# Patient Record
Sex: MALE | Race: WHITE | Employment: OTHER | URBAN - METROPOLITAN AREA
[De-identification: names, ages, dates, MRNs, and addresses within clinical notes are randomized per-mention and may not be internally consistent; named-entity substitution may affect disease eponyms.]

---

## 2021-08-13 ENCOUNTER — LAB ENCOUNTER (OUTPATIENT)
Dept: LAB | Facility: HOSPITAL | Age: 86
End: 2021-08-13
Attending: INTERNAL MEDICINE
Payer: MEDICARE

## 2021-08-13 DIAGNOSIS — I48.21 PERMANENT ATRIAL FIBRILLATION (HCC): Primary | ICD-10-CM

## 2021-08-13 DIAGNOSIS — I25.810 CORONARY ATHEROSCLEROSIS OF AUTOLOGOUS VEIN BYPASS GRAFT: ICD-10-CM

## 2021-08-13 LAB
ALBUMIN SERPL-MCNC: 3.8 G/DL (ref 3.4–5)
ALBUMIN/GLOB SERPL: 0.9 {RATIO} (ref 1–2)
ALP LIVER SERPL-CCNC: 83 U/L
ALT SERPL-CCNC: 129 U/L
ANION GAP SERPL CALC-SCNC: 6 MMOL/L (ref 0–18)
AST SERPL-CCNC: 40 U/L (ref 15–37)
BILIRUB SERPL-MCNC: 0.5 MG/DL (ref 0.1–2)
BUN BLD-MCNC: 15 MG/DL (ref 7–18)
BUN/CREAT SERPL: 15 (ref 10–20)
CALCIUM BLD-MCNC: 9.4 MG/DL (ref 8.5–10.1)
CHLORIDE SERPL-SCNC: 106 MMOL/L (ref 98–112)
CO2 SERPL-SCNC: 27 MMOL/L (ref 21–32)
CREAT BLD-MCNC: 1 MG/DL
GLOBULIN PLAS-MCNC: 4.3 G/DL (ref 2.8–4.4)
GLUCOSE BLD-MCNC: 91 MG/DL (ref 70–99)
M PROTEIN MFR SERPL ELPH: 8.1 G/DL (ref 6.4–8.2)
OSMOLALITY SERPL CALC.SUM OF ELEC: 288 MOSM/KG (ref 275–295)
PATIENT FASTING Y/N/NP: NO
POTASSIUM SERPL-SCNC: 4.5 MMOL/L (ref 3.5–5.1)
SODIUM SERPL-SCNC: 139 MMOL/L (ref 136–145)

## 2021-08-13 PROCEDURE — 80053 COMPREHEN METABOLIC PANEL: CPT

## 2021-08-13 PROCEDURE — 36415 COLL VENOUS BLD VENIPUNCTURE: CPT

## 2022-06-01 ENCOUNTER — APPOINTMENT (OUTPATIENT)
Dept: CT IMAGING | Age: 87
End: 2022-06-01
Attending: NEUROLOGICAL SURGERY

## 2022-07-28 ENCOUNTER — LAB ENCOUNTER (OUTPATIENT)
Dept: LAB | Facility: HOSPITAL | Age: 87
End: 2022-07-28
Attending: SURGERY
Payer: MEDICARE

## 2022-07-28 DIAGNOSIS — K80.01 CALCULUS OF GALLBLADDER WITH ACUTE CHOLECYSTITIS AND OBSTRUCTION: Primary | ICD-10-CM

## 2022-07-28 DIAGNOSIS — I48.11 LONGSTANDING PERSISTENT ATRIAL FIBRILLATION (HCC): ICD-10-CM

## 2022-07-28 LAB
ALBUMIN SERPL-MCNC: 3.7 G/DL (ref 3.4–5)
ALBUMIN/GLOB SERPL: 0.8 {RATIO} (ref 1–2)
ALP LIVER SERPL-CCNC: 72 U/L
ALT SERPL-CCNC: 47 U/L
ANION GAP SERPL CALC-SCNC: 6 MMOL/L (ref 0–18)
AST SERPL-CCNC: 25 U/L (ref 15–37)
BASOPHILS # BLD AUTO: 0.05 X10(3) UL (ref 0–0.2)
BASOPHILS NFR BLD AUTO: 0.4 %
BILIRUB SERPL-MCNC: 0.5 MG/DL (ref 0.1–2)
BUN BLD-MCNC: 19 MG/DL (ref 7–18)
BUN/CREAT SERPL: 15.8 (ref 10–20)
CALCIUM BLD-MCNC: 9.8 MG/DL (ref 8.5–10.1)
CHLORIDE SERPL-SCNC: 102 MMOL/L (ref 98–112)
CO2 SERPL-SCNC: 29 MMOL/L (ref 21–32)
CREAT BLD-MCNC: 1.2 MG/DL
DEPRECATED RDW RBC AUTO: 45.9 FL (ref 35.1–46.3)
EOSINOPHIL # BLD AUTO: 0.48 X10(3) UL (ref 0–0.7)
EOSINOPHIL NFR BLD AUTO: 4.3 %
ERYTHROCYTE [DISTWIDTH] IN BLOOD BY AUTOMATED COUNT: 13.2 % (ref 11–15)
FASTING STATUS PATIENT QL REPORTED: YES
GLOBULIN PLAS-MCNC: 4.9 G/DL (ref 2.8–4.4)
GLUCOSE BLD-MCNC: 92 MG/DL (ref 70–99)
HCT VFR BLD AUTO: 42.7 %
HGB BLD-MCNC: 13.8 G/DL
IMM GRANULOCYTES # BLD AUTO: 0.06 X10(3) UL (ref 0–1)
IMM GRANULOCYTES NFR BLD: 0.5 %
LIPASE SERPL-CCNC: 299 U/L (ref 73–393)
LYMPHOCYTES # BLD AUTO: 2.04 X10(3) UL (ref 1–4)
LYMPHOCYTES NFR BLD AUTO: 18.2 %
MCH RBC QN AUTO: 30.5 PG (ref 26–34)
MCHC RBC AUTO-ENTMCNC: 32.3 G/DL (ref 31–37)
MCV RBC AUTO: 94.3 FL
MONOCYTES # BLD AUTO: 0.94 X10(3) UL (ref 0.1–1)
MONOCYTES NFR BLD AUTO: 8.4 %
NEUTROPHILS # BLD AUTO: 7.65 X10 (3) UL (ref 1.5–7.7)
NEUTROPHILS # BLD AUTO: 7.65 X10(3) UL (ref 1.5–7.7)
NEUTROPHILS NFR BLD AUTO: 68.2 %
OSMOLALITY SERPL CALC.SUM OF ELEC: 286 MOSM/KG (ref 275–295)
PLATELET # BLD AUTO: 342 10(3)UL (ref 150–450)
POTASSIUM SERPL-SCNC: 4.1 MMOL/L (ref 3.5–5.1)
PROT SERPL-MCNC: 8.6 G/DL (ref 6.4–8.2)
RBC # BLD AUTO: 4.53 X10(6)UL
SODIUM SERPL-SCNC: 137 MMOL/L (ref 136–145)
WBC # BLD AUTO: 11.2 X10(3) UL (ref 4–11)

## 2022-07-28 PROCEDURE — 85025 COMPLETE CBC W/AUTO DIFF WBC: CPT

## 2022-07-28 PROCEDURE — 36415 COLL VENOUS BLD VENIPUNCTURE: CPT

## 2022-07-28 PROCEDURE — 83690 ASSAY OF LIPASE: CPT

## 2022-07-28 PROCEDURE — 80053 COMPREHEN METABOLIC PANEL: CPT

## 2022-08-02 ENCOUNTER — HOSPITAL ENCOUNTER (OUTPATIENT)
Dept: CT IMAGING | Facility: HOSPITAL | Age: 87
Discharge: HOME | End: 2022-08-02
Attending: INTERNAL MEDICINE
Payer: MEDICARE

## 2022-08-02 DIAGNOSIS — K85.90 PANCREATITIS: ICD-10-CM

## 2022-08-02 DIAGNOSIS — K80.20 GALL STONES: ICD-10-CM

## 2022-08-02 PROCEDURE — 74177 CT ABD & PELVIS W/CONTRAST: CPT | Performed by: INTERNAL MEDICINE

## 2022-08-14 ENCOUNTER — APPOINTMENT (OUTPATIENT)
Dept: GENERAL RADIOLOGY | Facility: HOSPITAL | Age: 87
End: 2022-08-14
Attending: EMERGENCY MEDICINE
Payer: MEDICARE

## 2022-08-14 ENCOUNTER — HOSPITAL ENCOUNTER (EMERGENCY)
Facility: HOSPITAL | Age: 87
Discharge: HOME | End: 2022-08-14
Attending: EMERGENCY MEDICINE
Payer: MEDICARE

## 2022-08-14 VITALS
DIASTOLIC BLOOD PRESSURE: 75 MMHG | OXYGEN SATURATION: 99 % | WEIGHT: 190 LBS | SYSTOLIC BLOOD PRESSURE: 132 MMHG | HEART RATE: 70 BPM | BODY MASS INDEX: 30.53 KG/M2 | HEIGHT: 66 IN | TEMPERATURE: 97 F | RESPIRATION RATE: 18 BRPM

## 2022-08-14 DIAGNOSIS — S22.41XA CLOSED FRACTURE OF MULTIPLE RIBS OF RIGHT SIDE, INITIAL ENCOUNTER: Primary | ICD-10-CM

## 2022-08-14 LAB
BILIRUB UR QL: NEGATIVE
CLARITY UR: CLEAR
COLOR UR: YELLOW
GLUCOSE UR-MCNC: NEGATIVE MG/DL
HGB UR QL STRIP.AUTO: NEGATIVE
KETONES UR-MCNC: NEGATIVE MG/DL
LEUKOCYTE ESTERASE UR QL STRIP.AUTO: NEGATIVE
NITRITE UR QL STRIP.AUTO: NEGATIVE
PH UR: 5.5 [PH] (ref 5–8)
PROT UR-MCNC: NEGATIVE MG/DL
SP GR UR STRIP: 1.01 (ref 1–1.03)
UROBILINOGEN UR STRIP-ACNC: 0.2

## 2022-08-14 PROCEDURE — 99283 EMERGENCY DEPT VISIT LOW MDM: CPT

## 2022-08-14 PROCEDURE — 81003 URINALYSIS AUTO W/O SCOPE: CPT | Performed by: EMERGENCY MEDICINE

## 2022-08-14 PROCEDURE — 71101 X-RAY EXAM UNILAT RIBS/CHEST: CPT | Performed by: EMERGENCY MEDICINE

## 2022-08-14 RX ORDER — FUROSEMIDE 20 MG/1
20 TABLET ORAL 2 TIMES DAILY
COMMUNITY

## 2022-08-14 RX ORDER — LOSARTAN POTASSIUM 25 MG/1
TABLET ORAL DAILY
COMMUNITY

## 2022-08-14 RX ORDER — SPIRONOLACTONE 25 MG/1
25 TABLET ORAL DAILY
COMMUNITY

## 2022-08-14 RX ORDER — ASPIRIN 81 MG/1
81 TABLET ORAL DAILY
COMMUNITY

## 2022-08-14 RX ORDER — FEXOFENADINE HCL 180 MG/1
180 TABLET ORAL DAILY
COMMUNITY

## 2022-08-14 RX ORDER — CARVEDILOL 3.12 MG/1
3.12 TABLET ORAL 2 TIMES DAILY
COMMUNITY
Start: 2022-05-11

## 2022-08-14 RX ORDER — PANTOPRAZOLE SODIUM 40 MG/1
40 TABLET, DELAYED RELEASE ORAL
COMMUNITY

## 2022-08-14 NOTE — ED INITIAL ASSESSMENT (HPI)
Patient fell backward and hit his back on a chair this morning. Patient c/o mid right back pain. Patient on eliquis. Patient took tylenol with relief.  Patient ambulatory in triage

## 2022-08-23 RX ORDER — SODIUM CHLORIDE 9 MG/ML
INJECTION, SOLUTION INTRAVENOUS
OUTPATIENT
Start: 2022-08-24 | End: 2022-08-24

## 2022-08-31 RX ORDER — ACETAMINOPHEN 160 MG
2000 TABLET,DISINTEGRATING ORAL DAILY
COMMUNITY

## 2022-08-31 RX ORDER — VIT A/VIT C/VIT E/ZINC/COPPER 7160-113
TABLET, DELAYED RELEASE (ENTERIC COATED) ORAL DAILY
COMMUNITY

## 2022-09-01 ENCOUNTER — HOSPITAL ENCOUNTER (OUTPATIENT)
Dept: INTERVENTIONAL RADIOLOGY/VASCULAR | Facility: HOSPITAL | Age: 87
Discharge: HOME | End: 2022-09-01
Attending: INTERNAL MEDICINE
Payer: MEDICARE

## 2022-09-06 ENCOUNTER — NURSE ONLY (OUTPATIENT)
Dept: LAB | Facility: HOSPITAL | Age: 87
End: 2022-09-06
Attending: INTERNAL MEDICINE
Payer: MEDICARE

## 2022-09-06 DIAGNOSIS — Z01.818 PRE-OP TESTING: ICD-10-CM

## 2022-09-06 LAB
ANION GAP SERPL CALC-SCNC: 6 MMOL/L (ref 0–18)
BUN BLD-MCNC: 17 MG/DL (ref 7–18)
BUN/CREAT SERPL: 15.2 (ref 10–20)
CALCIUM BLD-MCNC: 9.3 MG/DL (ref 8.5–10.1)
CHLORIDE SERPL-SCNC: 102 MMOL/L (ref 98–112)
CO2 SERPL-SCNC: 29 MMOL/L (ref 21–32)
CREAT BLD-MCNC: 1.12 MG/DL
DEPRECATED RDW RBC AUTO: 53.8 FL (ref 35.1–46.3)
ERYTHROCYTE [DISTWIDTH] IN BLOOD BY AUTOMATED COUNT: 14.8 % (ref 11–15)
FASTING STATUS PATIENT QL REPORTED: NO
GFR SERPLBLD BASED ON 1.73 SQ M-ARVRAT: 64 ML/MIN/1.73M2 (ref 60–?)
GLUCOSE BLD-MCNC: 89 MG/DL (ref 70–99)
HCT VFR BLD AUTO: 38.6 %
HGB BLD-MCNC: 12.1 G/DL
INR BLD: 1.57 (ref 0.85–1.16)
MCH RBC QN AUTO: 30.7 PG (ref 26–34)
MCHC RBC AUTO-ENTMCNC: 31.3 G/DL (ref 31–37)
MCV RBC AUTO: 98 FL
OSMOLALITY SERPL CALC.SUM OF ELEC: 285 MOSM/KG (ref 275–295)
PLATELET # BLD AUTO: 330 10(3)UL (ref 150–450)
POTASSIUM SERPL-SCNC: 4.7 MMOL/L (ref 3.5–5.1)
PROTHROMBIN TIME: 18.6 SECONDS (ref 11.6–14.8)
RBC # BLD AUTO: 3.94 X10(6)UL
SARS-COV-2 RNA RESP QL NAA+PROBE: NOT DETECTED
SODIUM SERPL-SCNC: 137 MMOL/L (ref 136–145)
WBC # BLD AUTO: 8.5 X10(3) UL (ref 4–11)

## 2022-09-06 PROCEDURE — 85610 PROTHROMBIN TIME: CPT

## 2022-09-06 PROCEDURE — 80048 BASIC METABOLIC PNL TOTAL CA: CPT

## 2022-09-06 PROCEDURE — 85027 COMPLETE CBC AUTOMATED: CPT

## 2022-09-06 PROCEDURE — 36415 COLL VENOUS BLD VENIPUNCTURE: CPT

## 2022-09-09 ENCOUNTER — HOSPITAL ENCOUNTER (OUTPATIENT)
Dept: INTERVENTIONAL RADIOLOGY/VASCULAR | Facility: HOSPITAL | Age: 87
Discharge: HOME | End: 2022-09-09
Attending: INTERNAL MEDICINE | Admitting: INTERNAL MEDICINE
Payer: MEDICARE

## 2022-09-09 VITALS
BODY MASS INDEX: 30.86 KG/M2 | DIASTOLIC BLOOD PRESSURE: 60 MMHG | SYSTOLIC BLOOD PRESSURE: 120 MMHG | RESPIRATION RATE: 13 BRPM | OXYGEN SATURATION: 96 % | HEIGHT: 66 IN | HEART RATE: 70 BPM | TEMPERATURE: 97 F | WEIGHT: 192 LBS

## 2022-09-09 DIAGNOSIS — I47.2 VT (VENTRICULAR TACHYCARDIA) (HCC): ICD-10-CM

## 2022-09-09 DIAGNOSIS — R94.39 ABNORMAL STRESS TEST: ICD-10-CM

## 2022-09-09 DIAGNOSIS — I50.9 CONGESTIVE HEART FAILURE (CHF) (HCC): ICD-10-CM

## 2022-09-09 DIAGNOSIS — Z95.1 HISTORY OF CORONARY ARTERY BYPASS GRAFT: ICD-10-CM

## 2022-09-09 DIAGNOSIS — Z95.5 HISTORY OF HEART ARTERY STENT: ICD-10-CM

## 2022-09-09 DIAGNOSIS — Z01.818 PRE-OP TESTING: Primary | ICD-10-CM

## 2022-09-09 DIAGNOSIS — I25.10 CAD (CORONARY ARTERY DISEASE): ICD-10-CM

## 2022-09-09 PROCEDURE — B2181ZZ FLUOROSCOPY OF LEFT INTERNAL MAMMARY BYPASS GRAFT USING LOW OSMOLAR CONTRAST: ICD-10-PCS | Performed by: INTERNAL MEDICINE

## 2022-09-09 PROCEDURE — B2131ZZ FLUOROSCOPY OF MULTIPLE CORONARY ARTERY BYPASS GRAFTS USING LOW OSMOLAR CONTRAST: ICD-10-PCS | Performed by: INTERNAL MEDICINE

## 2022-09-09 PROCEDURE — 4A023N7 MEASUREMENT OF CARDIAC SAMPLING AND PRESSURE, LEFT HEART, PERCUTANEOUS APPROACH: ICD-10-PCS | Performed by: INTERNAL MEDICINE

## 2022-09-09 PROCEDURE — 99152 MOD SED SAME PHYS/QHP 5/>YRS: CPT

## 2022-09-09 PROCEDURE — B2111ZZ FLUOROSCOPY OF MULTIPLE CORONARY ARTERIES USING LOW OSMOLAR CONTRAST: ICD-10-PCS | Performed by: INTERNAL MEDICINE

## 2022-09-09 PROCEDURE — B2151ZZ FLUOROSCOPY OF LEFT HEART USING LOW OSMOLAR CONTRAST: ICD-10-PCS | Performed by: INTERNAL MEDICINE

## 2022-09-09 PROCEDURE — 93458 L HRT ARTERY/VENTRICLE ANGIO: CPT

## 2022-09-09 RX ORDER — SODIUM CHLORIDE 9 MG/ML
INJECTION, SOLUTION INTRAVENOUS
Status: DISCONTINUED | OUTPATIENT
Start: 2022-09-09 | End: 2022-09-09

## 2022-09-09 RX ORDER — MIDAZOLAM HYDROCHLORIDE 1 MG/ML
INJECTION INTRAMUSCULAR; INTRAVENOUS
Status: COMPLETED
Start: 2022-09-09 | End: 2022-09-09

## 2022-09-09 RX ORDER — LIDOCAINE HYDROCHLORIDE 20 MG/ML
INJECTION, SOLUTION EPIDURAL; INFILTRATION; INTRACAUDAL; PERINEURAL
Status: COMPLETED
Start: 2022-09-09 | End: 2022-09-09

## 2022-09-09 RX ORDER — HEPARIN SODIUM 1000 [USP'U]/ML
INJECTION, SOLUTION INTRAVENOUS; SUBCUTANEOUS
Status: DISCONTINUED
Start: 2022-09-09 | End: 2022-09-09 | Stop reason: WASHOUT

## 2022-09-09 NOTE — INTERVAL H&P NOTE
Pre-op Diagnosis: * No pre-op diagnosis entered *    The above referenced H&P was reviewed by Coretta Jane MD on 9/9/2022, the patient was examined and no significant changes have occurred in the patient's condition since the H&P was performed. I discussed with the patient and/or legal representative the potential benefits, risks and side effects of this procedure; the likelihood of the patient achieving goals; and potential problems that might occur during recuperation. I discussed reasonable alternatives to the procedure, including risks, benefits and side effects related to the alternatives and risks related to not receiving this procedure. We will proceed with procedure as planned.

## 2022-09-09 NOTE — IVS NOTE
Tolerated procedure well. Right groin site clean and dry. No bleeding or swelling noted. Tolerated PO juice and crackers when able to sit up. Dr. Edis Jaime at bedside to talk to ptand family. Up to bathroom to void. Ambulated in the hallway. Right groin remains clean and dry, no bleeding noted. Discharge and follow-up instructions given. To re-start Eliquis on 9/10. Discharged home per W/C,VSS, groin stable.

## 2022-09-09 NOTE — PROCEDURES
[facility]    Cardiac Cath Procedure Note    Michelle Diaz Patient Status:  Outpatient in a Bed    1935 MRN [id number]   Location [facility] Attending Mima Hodgkin, [address] [facility] Day # 0 PCP No primary care provider on file. Cardiologist: Jones Galloway MD  Primary Proceduralist: Jones Galloway MD  Procedure Performed: LHC, LV, VEIN GRAFTS and LIMA  Date of Procedure: 2022   Indication: positive    Summary of procedure:    Patent vein grafts to the LAD, ramus, OM1, OM 2, PDA. Patent LIMA to diagonal.    No critical obstructive coronary disease and therefore may proceed to gallbladder surgery without further cardiac work-up. Left Ventriculography and hemodynamics:   LV EF not done  LV EDP 10 mmHg, 500 cc fluid bolus given  No significant gradient across bioprosthetic aortic valve      Coronary Angiography  RCA:  Dominant 100% occluded at the mid segment  -Patent SVG skip graft limb to the PDA, diffusely diseased native vessel runoff. Left main:  Free of obstructive disease    Left anterior descendin% occluded at the mid segment at site of old stent. Proximal LAD supplies large first septal branch to the base of the heart.    -Patent SVG to mid LAD   -Patent SVG to ramus, skip graft to OM1, OM 2 and PDA as below. Ramus: 100% occluded at the ostium, only visualized via skip graft.  -Patent SVG to ramus to OM1 to OM2 to PDA. No significant native vessel disease. Circumflex:  Free of obstructive disease in the proximal segment. Supplies multiple OM branches which are 100% occluded at the ostium and only visualized via the vein graft.  -Patent SVG to ramus to OM1 to OM2 to PDA, no significant native vessel disease.       Assessment:  False-positive stress test    Preoperative risk stratification: At acceptable risk for gallbladder surgery, may proceed without further intervention    CAD status post CABG: Patent LIMA to diagonal.  Patent SVG to ramus to OM1 to OM2 to PDA. Patent SVG to LAD. 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 Compensated based on LV filling pressures. Recommendations:  No changes to meds    Discharge home in 2 hours      Description of Procedure:   After written informed consent was obtained from the patient, patient was brought to the cardiac catheterization laboratory. Patient was prepped and draped in the usual sterile fashion. Lidocaine 1% was used to infiltrate the right groin for local anesthesia and a 6 Spanish introducer sheath was inserted into the right femoral artery via ultrasound guidance and micropuncture kit. Selective coronary angiography performed with JR4 catheter for RCA and JL4 catheter for LCA. Angiography performed in standard projections. IM catheter for LIMA, JR4 for vein grafts. 6 Georgian JR4 catheter placed in LV for hemodynamics. Selective right femoral angiogram done assess anatomy for closure. Specimen sent to: No specimen collected  Estimated blood loss: 10 cc  Closure:  Perclose       IV was maintained by RN and moderate conscious sedation of versed and fentanyl was given. Patient was assessed and monitoring of oxygen, heart rate and blood pressure by nurse and myself during the exam 35 minutes.       Ema Knott MD  09/09/22

## 2022-09-29 NOTE — PAT NURSING NOTE
Patient with [employer] ICD. Rep will need to be at hospital prior to surgery. Emery Cunningham paged. [phone]: Emery Cunningham returned call and aware of surgery date. Instructed will call back on 10/6 to give time of surgery.

## 2022-10-04 ENCOUNTER — LAB ENCOUNTER (OUTPATIENT)
Dept: LAB | Facility: HOSPITAL | Age: 87
End: 2022-10-04
Attending: SURGERY
Payer: MEDICARE

## 2022-10-04 DIAGNOSIS — Z01.818 PREOP TESTING: ICD-10-CM

## 2022-10-05 LAB — SARS-COV-2 RNA RESP QL NAA+PROBE: NOT DETECTED

## 2022-10-06 NOTE — PAT NURSING NOTE
Cony Noe at [address] [place] [address] notified of patients date and time of surgery

## 2022-10-07 ENCOUNTER — ANESTHESIA EVENT (OUTPATIENT)
Dept: SURGERY | Facility: HOSPITAL | Age: 87
End: 2022-10-07
Payer: MEDICARE

## 2022-10-07 ENCOUNTER — APPOINTMENT (OUTPATIENT)
Dept: GENERAL RADIOLOGY | Facility: HOSPITAL | Age: 87
End: 2022-10-07
Attending: SURGERY
Payer: MEDICARE

## 2022-10-07 ENCOUNTER — ANESTHESIA (OUTPATIENT)
Dept: SURGERY | Facility: HOSPITAL | Age: 87
End: 2022-10-07
Payer: MEDICARE

## 2022-10-07 ENCOUNTER — HOSPITAL ENCOUNTER (OUTPATIENT)
Facility: HOSPITAL | Age: 87
Setting detail: HOSPITAL OUTPATIENT SURGERY
Discharge: HOME | End: 2022-10-07
Attending: SURGERY | Admitting: SURGERY
Payer: MEDICARE

## 2022-10-07 VITALS
HEART RATE: 71 BPM | RESPIRATION RATE: 14 BRPM | DIASTOLIC BLOOD PRESSURE: 83 MMHG | OXYGEN SATURATION: 100 % | SYSTOLIC BLOOD PRESSURE: 151 MMHG | TEMPERATURE: 97 F | HEIGHT: 66 IN | WEIGHT: 182 LBS | BODY MASS INDEX: 29.25 KG/M2

## 2022-10-07 DIAGNOSIS — Z01.818 PREOP TESTING: Primary | ICD-10-CM

## 2022-10-07 PROBLEM — K80.50 COMMON BILE DUCT STONE: Status: ACTIVE | Noted: 2022-10-07

## 2022-10-07 PROBLEM — K85.10 PANCREATITIS, GALLSTONE: Status: ACTIVE | Noted: 2022-10-07

## 2022-10-07 PROBLEM — K85.10 PANCREATITIS, GALLSTONE (HCC): Status: ACTIVE | Noted: 2022-10-07

## 2022-10-07 LAB — GLUCOSE BLDC GLUCOMTR-MCNC: 203 MG/DL (ref 70–99)

## 2022-10-07 PROCEDURE — 0FT44ZZ RESECTION OF GALLBLADDER, PERCUTANEOUS ENDOSCOPIC APPROACH: ICD-10-PCS | Performed by: SURGERY

## 2022-10-07 PROCEDURE — 88304 TISSUE EXAM BY PATHOLOGIST: CPT | Performed by: SURGERY

## 2022-10-07 PROCEDURE — 82962 GLUCOSE BLOOD TEST: CPT

## 2022-10-07 PROCEDURE — BF502Z0 OTHER IMAGING OF BILE DUCTS USING FLUORESCING AGENT, INTRAOPERATIVE: ICD-10-PCS | Performed by: SURGERY

## 2022-10-07 PROCEDURE — 0FC94ZZ EXTIRPATION OF MATTER FROM COMMON BILE DUCT, PERCUTANEOUS ENDOSCOPIC APPROACH: ICD-10-PCS | Performed by: SURGERY

## 2022-10-07 PROCEDURE — 74300 X-RAY BILE DUCTS/PANCREAS: CPT | Performed by: SURGERY

## 2022-10-07 RX ORDER — NALOXONE HYDROCHLORIDE 0.4 MG/ML
80 INJECTION, SOLUTION INTRAMUSCULAR; INTRAVENOUS; SUBCUTANEOUS AS NEEDED
Status: DISCONTINUED | OUTPATIENT
Start: 2022-10-07 | End: 2022-10-07

## 2022-10-07 RX ORDER — SODIUM CHLORIDE 9 MG/ML
INJECTION, SOLUTION INTRAVENOUS CONTINUOUS
Status: DISCONTINUED | OUTPATIENT
Start: 2022-10-07 | End: 2022-10-07

## 2022-10-07 RX ORDER — MORPHINE SULFATE 4 MG/ML
4 INJECTION, SOLUTION INTRAMUSCULAR; INTRAVENOUS EVERY 10 MIN PRN
Status: DISCONTINUED | OUTPATIENT
Start: 2022-10-07 | End: 2022-10-07

## 2022-10-07 RX ORDER — ROCURONIUM BROMIDE 10 MG/ML
INJECTION, SOLUTION INTRAVENOUS AS NEEDED
Status: DISCONTINUED | OUTPATIENT
Start: 2022-10-07 | End: 2022-10-07 | Stop reason: SURG

## 2022-10-07 RX ORDER — LIDOCAINE HYDROCHLORIDE 10 MG/ML
INJECTION, SOLUTION EPIDURAL; INFILTRATION; INTRACAUDAL; PERINEURAL AS NEEDED
Status: DISCONTINUED | OUTPATIENT
Start: 2022-10-07 | End: 2022-10-07 | Stop reason: SURG

## 2022-10-07 RX ORDER — MORPHINE SULFATE 4 MG/ML
2 INJECTION, SOLUTION INTRAMUSCULAR; INTRAVENOUS EVERY 10 MIN PRN
Status: DISCONTINUED | OUTPATIENT
Start: 2022-10-07 | End: 2022-10-07

## 2022-10-07 RX ORDER — HYDROCODONE BITARTRATE AND ACETAMINOPHEN 5; 325 MG/1; MG/1
1 TABLET ORAL EVERY 8 HOURS PRN
Qty: 10 TABLET | Refills: 0 | Status: SHIPPED | OUTPATIENT
Start: 2022-10-07 | End: 2022-10-17

## 2022-10-07 RX ORDER — EPHEDRINE SULFATE 50 MG/ML
INJECTION, SOLUTION INTRAVENOUS AS NEEDED
Status: DISCONTINUED | OUTPATIENT
Start: 2022-10-07 | End: 2022-10-07 | Stop reason: SURG

## 2022-10-07 RX ORDER — ACETAMINOPHEN 500 MG
1000 TABLET ORAL ONCE
Status: COMPLETED | OUTPATIENT
Start: 2022-10-07 | End: 2022-10-07

## 2022-10-07 RX ORDER — SODIUM CHLORIDE, SODIUM LACTATE, POTASSIUM CHLORIDE, CALCIUM CHLORIDE 600; 310; 30; 20 MG/100ML; MG/100ML; MG/100ML; MG/100ML
INJECTION, SOLUTION INTRAVENOUS CONTINUOUS
Status: DISCONTINUED | OUTPATIENT
Start: 2022-10-07 | End: 2022-10-07

## 2022-10-07 RX ORDER — MORPHINE SULFATE 10 MG/ML
6 INJECTION, SOLUTION INTRAMUSCULAR; INTRAVENOUS EVERY 10 MIN PRN
Status: DISCONTINUED | OUTPATIENT
Start: 2022-10-07 | End: 2022-10-07

## 2022-10-07 RX ORDER — HYDROMORPHONE HYDROCHLORIDE 1 MG/ML
0.4 INJECTION, SOLUTION INTRAMUSCULAR; INTRAVENOUS; SUBCUTANEOUS EVERY 5 MIN PRN
Status: DISCONTINUED | OUTPATIENT
Start: 2022-10-07 | End: 2022-10-07

## 2022-10-07 RX ORDER — CEFAZOLIN SODIUM/WATER 2 G/20 ML
2 SYRINGE (ML) INTRAVENOUS ONCE
Status: COMPLETED | OUTPATIENT
Start: 2022-10-07 | End: 2022-10-07

## 2022-10-07 RX ORDER — BUPIVACAINE HYDROCHLORIDE 2.5 MG/ML
INJECTION, SOLUTION EPIDURAL; INFILTRATION; INTRACAUDAL AS NEEDED
Status: DISCONTINUED | OUTPATIENT
Start: 2022-10-07 | End: 2022-10-07 | Stop reason: HOSPADM

## 2022-10-07 RX ORDER — PHENYLEPHRINE HCL 10 MG/ML
VIAL (ML) INJECTION AS NEEDED
Status: DISCONTINUED | OUTPATIENT
Start: 2022-10-07 | End: 2022-10-07 | Stop reason: SURG

## 2022-10-07 RX ORDER — HYDROMORPHONE HYDROCHLORIDE 1 MG/ML
0.6 INJECTION, SOLUTION INTRAMUSCULAR; INTRAVENOUS; SUBCUTANEOUS EVERY 5 MIN PRN
Status: DISCONTINUED | OUTPATIENT
Start: 2022-10-07 | End: 2022-10-07

## 2022-10-07 RX ADMIN — PHENYLEPHRINE HCL 100 MCG: 10 MG/ML VIAL (ML) INJECTION at 09:24:00

## 2022-10-07 RX ADMIN — PHENYLEPHRINE HCL 100 MCG: 10 MG/ML VIAL (ML) INJECTION at 09:13:00

## 2022-10-07 RX ADMIN — SODIUM CHLORIDE: 9 INJECTION, SOLUTION INTRAVENOUS at 10:36:00

## 2022-10-07 RX ADMIN — ROCURONIUM BROMIDE 30 MG: 10 INJECTION, SOLUTION INTRAVENOUS at 09:08:00

## 2022-10-07 RX ADMIN — EPHEDRINE SULFATE 5 MG: 50 INJECTION, SOLUTION INTRAVENOUS at 09:26:00

## 2022-10-07 RX ADMIN — CEFAZOLIN SODIUM/WATER 2 G: 2 G/20 ML SYRINGE (ML) INTRAVENOUS at 09:23:00

## 2022-10-07 RX ADMIN — LIDOCAINE HYDROCHLORIDE 40 MG: 10 INJECTION, SOLUTION EPIDURAL; INFILTRATION; INTRACAUDAL; PERINEURAL at 09:08:00

## 2022-10-07 RX ADMIN — SODIUM CHLORIDE: 9 INJECTION, SOLUTION INTRAVENOUS at 09:07:00

## 2022-10-07 NOTE — ANESTHESIA PROCEDURE NOTES
Airway  Date/Time: 10/7/2022 9:11 AM  Urgency: Elective    Airway not difficult    General Information and Staff    Patient location during procedure: OR  Anesthesiologist: Otoniel Escoto MD  Performed: anesthesiologist     Indications and Patient Condition  Indications for airway management: anesthesia  Sedation level: deep  Preoxygenated: yes  Patient position: sniffing  Mask difficulty assessment: 1 - vent by mask    Final Airway Details  Final airway type: endotracheal airway      Successful airway: ETT  Cuffed: yes   Successful intubation technique: direct laryngoscopy  Endotracheal tube insertion site: oral  Blade: GlideScope  Blade size: #3  ETT size (mm): 7.5    Cormack-Lehane Classification: grade I - full view of glottis  Placement verified by: chest auscultation and capnometry   Measured from: teeth  ETT to teeth (cm): 22  Number of attempts at approach: 1    Additional Comments  OGT placed, gastric contents suctioned

## 2022-10-07 NOTE — OPERATIVE REPORT
[facility]    PATIENT'S NAME: Ann APARICIO   ATTENDING PHYSICIAN: Tico Schmidt MD   OPERATING PHYSICIAN: Tico Schmidt MD   PATIENT ACCOUNT#:   [id number]    LOCATION:  [facility] [address] [facility]  MEDICAL RECORD #:   [id number]       YOB: 1935  ADMISSION DATE:       10/07/2022      OPERATION DATE:  10/07/2022    OPERATIVE REPORT      PREOPERATIVE DIAGNOSIS:  Gallstone pancreatitis. POSTOPERATIVE DIAGNOSIS:  Gallstone pancreatitis. PROCEDURE:  Laparoscopic cholecystectomy, intraoperative cholangiogram, and common bile duct exploration laparoscopically with extraction of common bile duct stone. ASSISTANT:  Gerrie Eisenmenger, CSA. Criss Avalos is a certified surgical assistant needed for the entire procedure for both opening and closing, running the camera, intraoperative assistance, assisting with common duct exploration. INDICATIONS:  The patient is a very pleasant 28-year-old male with gallstone pancreatitis with gallstones. Plan was to take out his gallbladder. He had been worked up by Cardiology. He had multiple cardiac problems including pacemaker on anticoagulants. He was evaluated by Cardiology and cleared for surgery after angiogram, was thought to be a reasonable risk. OPERATIVE TECHNIQUE:  Patient was prepped and draped in usual sterile fashion. Incision was made through the base of the umbilicus. Open technique was used; all trocars placed. Area was inspected with no bleeding or bowel injury. The 5 mm port was placed in the left upper quadrant under direct vision, and 2 ports on the right side under direct vision. The gallbladder wound was minimally inflamed. It was fairly thin. Placed on gentle traction. Cystic duct and cystic artery slowly dissected out. The critical view was seen, and the triangle of Calot was well dissected out. The cystic artery was doubly hemoclipped towards the patient and distally and divided.   We came around the cystic duct which was a little bit larger. It had multiple small stones within the gallbladder. The cystic duct cholangiogram was performed. There was a stone stuck at the ampulla, and we gave glucagon. It still did not pass. We had been getting the wire ready. We passed the catheter itself through the ampulla into the common bile duct, confirmed by x-ray. We pulled it back, and an x-ray showed it passed. The small stone had been pushed through. Cystic duct was triply hemoclipped, careful not to impinge upon the common bile duct. The gallbladder was removed off the gallbladder bed with blunt and sharp dissection, cautery for hemostasis. Gallbladder placed in a bag and removed. We went back in. There were a couple small stones. These were all removed, carefully inspected, and irrigated to be sure there were no others. Once we had cleared it and made sure there was no bile leak or bleeding or stones remaining, we removed the 5 mm ports under direct vision. Fascia closed at the umbilical site with 0 Vicryl and 0 PDS. Wound irrigated. Skin closed with 4-0 Vicryl and Dermabond. Patient tolerated procedure well, went to recovery room in good condition. Dictated By Denisha Casiano MD  d: 10/07/2022 10:51:40  t: 10/07/2022 17:06:16  Job [id number]  [facility]/

## 2022-10-07 NOTE — BRIEF OP NOTE
Pre-Operative Diagnosis: Gallsotne pancreatitis     Post-Operative Diagnosis: Gallsotne pancreatitis      Procedure Performed:   Laparoscopic cholecystectomy, cholangiogram, common bile duct exploratioin .     Surgeon(s) and Role:     * Amarilis Stone MD - Primary    Assistant(s):  Surgical Assistant.: Francie Haider CSA     Surgical Findings:  Common duct stone in ampula on cholangiogram     Specimen: gallbladder to path     Estimated Blood Loss: 10 ml    Dictation Number:  [id number]    Alice Castorena MD  10/7/2022  10:48 AM

## 2022-10-07 NOTE — DISCHARGE SUMMARY
[facility]  Discharge Summary    Ivanna Newell Patient Status:  Hospital Outpatient Surgery    1935 MRN [id number]   Location [address] UNIT Attending Jeana Moran MD   [address] Day # 0 PCP Monsoor Harriett Dandy, MD     Date of Admission: 10/7/2022    Date of Discharge: 10/7/2022      Admitting Diagnosis: Gallsotne pancreatitis    Discharge Diagnosis: Patient Active Problem List:     Pancreatitis, gallstone     Common bile duct stone      Reason for Admission    Removal of gallbladder      Hospital Course: Tolerated surgery well    Procedures: laparoscopic cholecystectomy, intraoperative cholangiogram, common duct exploration    Complications: none    Disposition: Home or Self Care    Discharge Condition: Good    Discharge Medications: Current Discharge Medication List    START taking these medications    HYDROcodone-acetaminophen 5-325 MG Oral Tab  Take 1 tablet by mouth every 8 (eight) hours as needed for Pain (may start with 1/2 tablet). Qty: 10 tablet Refills: 0      CONTINUE these medications which have NOT CHANGED    Multiple Vitamin (THERAGRAN OR)  Take by mouth daily. Multiple Vitamins-Minerals (ICAPS AREDS FORMULA) Oral Tab  Take by mouth daily. cholecalciferol 50 MCG (2000 UT) Oral Cap  Take 2,000 Units by mouth daily. carvedilol 3.125 MG Oral Tab  Take 3.125 mg by mouth 2 (two) times daily. pantoprazole 40 MG Oral Tab EC  Take 40 mg by mouth every morning before breakfast.    furosemide 20 MG Oral Tab  Take 20 mg by mouth daily. losartan 25 MG Oral Tab  Take by mouth daily. spironolactone 25 MG Oral Tab  Take 12.5 mg by mouth daily. apixaban 5 MG Oral Tab  Take 5 mg by mouth 2 (two) times daily. Start morning of 10/8    fexofenadine 180 MG Oral Tab  Take 180 mg by mouth daily. aspirin 81 MG Oral Tab EC  Take 81 mg by mouth daily. Follow up Visits:  Follow-up with Dr Mayela Roberson 1-2 weeks      Yvan Olmedo MD  10/7/2022  10:57 AM

## 2022-10-07 NOTE — ANESTHESIA POSTPROCEDURE EVALUATION
Patient: Onur Wagner    Procedure Summary     Date: 10/07/22 Room / Location: [address] [place] [address] MAIN OR 08 / [address] [place] [address] MAIN OR    Anesthesia Start: 1113 Anesthesia Stop: 6250    Procedure: Laparoscopic cholecystectomy, cholangiogram, common bile duct exploratioin . (N/A Abdomen) Diagnosis: (Gallsotne pancreatitis)    Surgeons: Enrique Sharp MD Anesthesiologist: Isma Levy MD    Anesthesia Type: general ASA Status: 4          Anesthesia Type: general    Vitals Value Taken Time   /79 10/07/22 1035   Temp 97.1 10/07/22 1036   Pulse 70 10/07/22 1036   Resp 19 10/07/22 1036   SpO2 93 % 10/07/22 1036   Vitals shown include unvalidated device data.     [address] [place] [address] AN Post Evaluation:   Patient Evaluated in PACU  Patient Participation: complete - patient participated  Level of Consciousness: awake  Pain Score: 0  Pain Management: adequate  Airway Patency:patent  Dental exam unchanged from preop  Yes    Cardiovascular Status: acceptable  Respiratory Status: acceptable  Postoperative Hydration acceptable      Casey Regan MD  10/7/2022 10:36 AM

## 2024-01-04 ENCOUNTER — OFFICE VISIT (OUTPATIENT)
Dept: AUDIOLOGY | Facility: CLINIC | Age: 89
End: 2024-01-04
Payer: MEDICARE

## 2024-01-04 DIAGNOSIS — H90.3 SENSORINEURAL HEARING LOSS (SNHL) OF BOTH EARS: Primary | ICD-10-CM

## 2024-01-04 PROCEDURE — 92557 COMPREHENSIVE HEARING TEST: CPT | Performed by: AUDIOLOGIST

## 2024-04-29 ENCOUNTER — LAB ENCOUNTER (OUTPATIENT)
Dept: LAB | Facility: HOSPITAL | Age: 89
End: 2024-04-29
Attending: INTERNAL MEDICINE
Payer: MEDICARE

## 2024-04-29 DIAGNOSIS — I50.22 CHRONIC SYSTOLIC HEART FAILURE (HCC): Primary | ICD-10-CM

## 2024-04-29 DIAGNOSIS — I25.810: ICD-10-CM

## 2024-04-29 DIAGNOSIS — I48.21 PERMANENT ATRIAL FIBRILLATION (HCC): ICD-10-CM

## 2024-04-29 LAB
ALBUMIN SERPL-MCNC: 4.4 G/DL (ref 3.2–4.8)
ALBUMIN/GLOB SERPL: 1.2 {RATIO} (ref 1–2)
ALP LIVER SERPL-CCNC: 64 U/L
ALT SERPL-CCNC: 21 U/L
ANION GAP SERPL CALC-SCNC: 6 MMOL/L (ref 0–18)
AST SERPL-CCNC: 22 U/L (ref ?–34)
BILIRUB SERPL-MCNC: 0.8 MG/DL (ref 0.2–1.1)
BUN BLD-MCNC: 15 MG/DL (ref 9–23)
BUN/CREAT SERPL: 16.3 (ref 10–20)
CALCIUM BLD-MCNC: 9.6 MG/DL (ref 8.7–10.4)
CHLORIDE SERPL-SCNC: 108 MMOL/L (ref 98–112)
CHOLEST SERPL-MCNC: 141 MG/DL (ref ?–200)
CO2 SERPL-SCNC: 27 MMOL/L (ref 21–32)
CREAT BLD-MCNC: 0.92 MG/DL
DEPRECATED RDW RBC AUTO: 44.6 FL (ref 35.1–46.3)
EGFRCR SERPLBLD CKD-EPI 2021: 81 ML/MIN/1.73M2 (ref 60–?)
ERYTHROCYTE [DISTWIDTH] IN BLOOD BY AUTOMATED COUNT: 13.2 % (ref 11–15)
FASTING PATIENT LIPID ANSWER: YES
FASTING STATUS PATIENT QL REPORTED: YES
GLOBULIN PLAS-MCNC: 3.6 G/DL (ref 2.8–4.4)
GLUCOSE BLD-MCNC: 121 MG/DL (ref 70–99)
HCT VFR BLD AUTO: 41.4 %
HDLC SERPL-MCNC: 44 MG/DL (ref 40–59)
HGB BLD-MCNC: 13.7 G/DL
LDLC SERPL CALC-MCNC: 81 MG/DL (ref ?–100)
MCH RBC QN AUTO: 30.3 PG (ref 26–34)
MCHC RBC AUTO-ENTMCNC: 33.1 G/DL (ref 31–37)
MCV RBC AUTO: 91.6 FL
NONHDLC SERPL-MCNC: 97 MG/DL (ref ?–130)
OSMOLALITY SERPL CALC.SUM OF ELEC: 294 MOSM/KG (ref 275–295)
PLATELET # BLD AUTO: 279 10(3)UL (ref 150–450)
POTASSIUM SERPL-SCNC: 4.4 MMOL/L (ref 3.5–5.1)
PROT SERPL-MCNC: 8 G/DL (ref 5.7–8.2)
RBC # BLD AUTO: 4.52 X10(6)UL
SODIUM SERPL-SCNC: 141 MMOL/L (ref 136–145)
TRIGL SERPL-MCNC: 81 MG/DL (ref 30–149)
VLDLC SERPL CALC-MCNC: 13 MG/DL (ref 0–30)
WBC # BLD AUTO: 9.4 X10(3) UL (ref 4–11)

## 2024-04-29 PROCEDURE — 80053 COMPREHEN METABOLIC PANEL: CPT

## 2024-04-29 PROCEDURE — 80061 LIPID PANEL: CPT

## 2024-04-29 PROCEDURE — 36415 COLL VENOUS BLD VENIPUNCTURE: CPT

## 2024-04-29 PROCEDURE — 85027 COMPLETE CBC AUTOMATED: CPT

## 2025-04-29 ENCOUNTER — HOSPITAL ENCOUNTER (OUTPATIENT)
Dept: CT IMAGING | Facility: HOSPITAL | Age: 89
Discharge: HOME | End: 2025-04-29
Attending: INTERNAL MEDICINE
Payer: MEDICARE

## 2025-04-29 DIAGNOSIS — M48.02 CERVICAL SPINAL STENOSIS: ICD-10-CM

## 2025-04-29 PROCEDURE — 72125 CT NECK SPINE W/O DYE: CPT | Performed by: INTERNAL MEDICINE

## 2025-08-01 ENCOUNTER — HOSPITAL ENCOUNTER (OUTPATIENT)
Dept: CT IMAGING | Facility: HOSPITAL | Age: 89
Discharge: HOME | End: 2025-08-01
Attending: INTERNAL MEDICINE

## 2025-08-01 DIAGNOSIS — N20.0 KIDNEY STONE: ICD-10-CM

## 2025-08-01 PROCEDURE — 74176 CT ABD & PELVIS W/O CONTRAST: CPT | Performed by: INTERNAL MEDICINE

## 2025-08-13 ENCOUNTER — HOSPITAL ENCOUNTER (OUTPATIENT)
Dept: CT IMAGING | Facility: HOSPITAL | Age: 89
Discharge: HOME | End: 2025-08-13
Attending: INTERNAL MEDICINE

## 2025-08-13 DIAGNOSIS — R91.8 OPACITY OF LUNG ON IMAGING STUDY: ICD-10-CM

## 2025-08-13 LAB
CREAT BLD-MCNC: 1.3 MG/DL (ref 0.7–1.3)
EGFRCR SERPLBLD CKD-EPI 2021: 53 ML/MIN/1.73M2 (ref 60–?)

## 2025-08-13 PROCEDURE — 82565 ASSAY OF CREATININE: CPT

## 2025-08-13 PROCEDURE — 71260 CT THORAX DX C+: CPT | Performed by: INTERNAL MEDICINE

## (undated) DEVICE — TROCAR: Brand: KII FIOS FIRST ENTRY

## (undated) DEVICE — DISPOSABLE LAPAROSCOPIC CLIP APPLIER WITH 20 CLIPS.: Brand: EPIX® UNIVERSAL CLIP APPLIER

## (undated) DEVICE — TROCAR: Brand: KII® SLEEVE

## (undated) DEVICE — SOLUTION  .9 3000ML

## (undated) DEVICE — GUIDE WRE .032IN 150CM STRG

## (undated) DEVICE — [HIGH FLOW INSUFFLATOR,  DO NOT USE IF PACKAGE IS DAMAGED,  KEEP DRY,  KEEP AWAY FROM SUNLIGHT,  PROTECT FROM HEAT AND RADIOACTIVE SOURCES.]: Brand: PNEUMOSURE

## (undated) DEVICE — MEGADYNE E-Z CLEAN BLADE 2.75"

## (undated) DEVICE — SPNG GZ W4XL4IN COT 12 PLY TYP

## (undated) DEVICE — SOLUTION  .9 1000ML BTL

## (undated) DEVICE — NEEDLE HPO 18GA 1.5IN ECLPS

## (undated) DEVICE — UNDYED BRAIDED (POLYGLACTIN 910), SYNTHETIC ABSORBABLE SUTURE: Brand: COATED VICRYL

## (undated) DEVICE — LAP CHOLE: Brand: MEDLINE INDUSTRIES, INC.

## (undated) DEVICE — ENCORE® LATEX ACCLAIM SIZE 8, STERILE LATEX POWDER-FREE SURGICAL GLOVE: Brand: ENCORE

## (undated) DEVICE — OPEN-END URETERAL CATHETER SOF-FLEX: Brand: SOF-FLEX

## (undated) DEVICE — TISSUE RETRIEVAL SYSTEM: Brand: INZII RETRIEVAL SYSTEM

## (undated) DEVICE — ENCORE® LATEX MICRO SIZE 8, STERILE LATEX POWDER-FREE SURGICAL GLOVE: Brand: ENCORE

## (undated) DEVICE — 12 ML SYRINGE LUER-LOCK TIP: Brand: MONOJECT

## (undated) DEVICE — SUT VICRYL 0 UR-6 J603H